# Patient Record
Sex: FEMALE | Race: WHITE | NOT HISPANIC OR LATINO | Employment: FULL TIME | URBAN - METROPOLITAN AREA
[De-identification: names, ages, dates, MRNs, and addresses within clinical notes are randomized per-mention and may not be internally consistent; named-entity substitution may affect disease eponyms.]

---

## 2019-10-14 ENCOUNTER — TRANSCRIBE ORDERS (OUTPATIENT)
Dept: PERINATAL CARE | Facility: CLINIC | Age: 29
End: 2019-10-14

## 2019-10-14 ENCOUNTER — OB ABSTRACT (OUTPATIENT)
Dept: PERINATAL CARE | Facility: CLINIC | Age: 29
End: 2019-10-14

## 2019-10-14 DIAGNOSIS — O99.810 ABNORMAL GLUCOSE COMPLICATING PREGNANCY: Primary | ICD-10-CM

## 2019-10-14 DIAGNOSIS — O09.93 HIGH-RISK PREGNANCY SUPERVISION, THIRD TRIMESTER: Primary | ICD-10-CM

## 2019-10-14 NOTE — PATIENT INSTRUCTIONS
Thank you for choosing [facility] for your  care today  If you have any questions about your ultrasound or care, please do not hesitate to contact us or your primary obstetrician  Please communicate your blood sugars at least weekly with the diabetic educators at the [address] [place] [address] Diabetes in Pregnancy program   The telephone number is [phone]  The e-mail address is [email or web address]  If you do not hear back from the program within 48 hours of sending your blood sugars, please call REESE Gee at [phone]  Thank you  Thank you so much for getting your influenza vaccine! Please aim for vaccination of your entire household and insist that anyone coming into contact with your baby be vaccinated    Tell your partner they need to be vaccinated as well to keep you safe (doctors orders:)

## 2019-10-16 ENCOUNTER — OFFICE VISIT (OUTPATIENT)
Dept: PERINATAL CARE | Facility: CLINIC | Age: 29
End: 2019-10-16
Payer: COMMERCIAL

## 2019-10-16 ENCOUNTER — ROUTINE PRENATAL (OUTPATIENT)
Dept: PERINATAL CARE | Facility: CLINIC | Age: 29
End: 2019-10-16
Payer: COMMERCIAL

## 2019-10-16 VITALS
BODY MASS INDEX: 29.7 KG/M2 | SYSTOLIC BLOOD PRESSURE: 128 MMHG | WEIGHT: 184.8 LBS | HEIGHT: 66 IN | DIASTOLIC BLOOD PRESSURE: 83 MMHG | HEART RATE: 98 BPM

## 2019-10-16 DIAGNOSIS — O99.810 ABNORMAL GLUCOSE COMPLICATING PREGNANCY: ICD-10-CM

## 2019-10-16 DIAGNOSIS — O24.410 DIET CONTROLLED GESTATIONAL DIABETES MELLITUS (GDM) IN THIRD TRIMESTER: Primary | ICD-10-CM

## 2019-10-16 DIAGNOSIS — O24.419 GESTATIONAL DIABETES MELLITUS (GDM) IN THIRD TRIMESTER, GESTATIONAL DIABETES METHOD OF CONTROL UNSPECIFIED: Primary | ICD-10-CM

## 2019-10-16 DIAGNOSIS — Z3A.30 30 WEEKS GESTATION OF PREGNANCY: ICD-10-CM

## 2019-10-16 DIAGNOSIS — E66.3 OVERWEIGHT WITH BODY MASS INDEX (BMI) 25.0-29.9: ICD-10-CM

## 2019-10-16 DIAGNOSIS — O09.93 HIGH-RISK PREGNANCY SUPERVISION, THIRD TRIMESTER: ICD-10-CM

## 2019-10-16 DIAGNOSIS — Z36.3 ENCOUNTER FOR ANTENATAL SCREENING FOR MALFORMATIONS: ICD-10-CM

## 2019-10-16 PROCEDURE — 76805 OB US >/= 14 WKS SNGL FETUS: CPT | Performed by: OBSTETRICS & GYNECOLOGY

## 2019-10-16 PROCEDURE — G0108 DIAB MANAGE TRN  PER INDIV: HCPCS | Performed by: DIETITIAN, REGISTERED

## 2019-10-16 PROCEDURE — 99241 PR OFFICE CONSULTATION NEW/ESTAB PATIENT 15 MIN: CPT | Performed by: OBSTETRICS & GYNECOLOGY

## 2019-10-16 RX ORDER — BLOOD SUGAR DIAGNOSTIC
STRIP MISCELLANEOUS
Qty: 100 EACH | Refills: 4 | Status: SHIPPED | OUTPATIENT
Start: 2019-10-16 | End: 2019-12-25

## 2019-10-16 RX ORDER — LANCETS 33 GAUGE
EACH MISCELLANEOUS
Qty: 100 EACH | Refills: 4 | Status: SHIPPED | OUTPATIENT
Start: 2019-10-16 | End: 2019-12-25

## 2019-10-16 NOTE — PROGRESS NOTES
10/16/19  Nava Davenport   1990  Estimated Date of Delivery: 19   EGA: 30w0d    Dear Dr Cristobal Khalil at [facility]:    Thank you for referring your patient to the Diabetes and Pregnancy Program at [address] [place] [address]  The patient attended class 1 and patient received the following education:     Pathophysiology of diabetes and pregnancy  This includes maternal-fetal complications such as fetal macrosomia,  hypoglycemia, polyhydramnios, increased incidence of  section, pre-term labor and in severe cases, fetal demise and stillbirth   Instruction on diet and glucometer use was provided  Self-monitoring of blood glucose levels: fasting (goal 60mg/dl to 90mg/dl) and two hours after the start of the meal less (goal less than 120mg/dl)  The patient was provided with a One-Touch Verio blood glucose meter and supplies  Blood glucose during demonstration was 87   Medical Nutrition Therapy for diabetes and pregnancy  The patient was provided with a 2000 calorie meal plan and the following was reviewed:     o Basic review of macronutrients   o Meal pattern should consist of three small meals and three snacks daily  o Carbohydrate gram amounts per meal   o Instructions on how to read a food label  o Appropriate serving sizes for carbohydrates and proteins  o Incorporating protein at each meal and snack  o Maintain a three day food diary and bring to class 2    [facility] blood glucose [facility] the [facility] weekly or as directed:  o Phone : [phone]  If no response in 24 hours, call [phone]   o Fax: [phone]  o Email: [email or web address]  The patient is scheduled to attend class 2 on Friday, 10/25/19  Additionally, fetal ultrasound evaluation by the Perinatologist has been scheduled to assure continuity of care  Please contact the Diabetes and Pregnancy Program at [phone] if you have any questions    Time spent with patient 9:35-10:35 AM; time spent face to face counseling greater than 50% of the appointment      Sincerely,   Berkley Gomez  Diabetes Educator   Diabetes and Pregnancy Program

## 2019-10-16 NOTE — PROGRESS NOTES
[address]: Ms Gordon Pritchard was seen today at 30w0d for anatomic survey ultrasound  See ultrasound report under "OB Procedures" tab  Please don't hesitate to contact our office with any concerns or questions    Sammie Colvin MD

## 2019-10-25 ENCOUNTER — DOCUMENTATION (OUTPATIENT)
Dept: PERINATAL CARE | Facility: CLINIC | Age: 29
End: 2019-10-25

## 2019-10-25 ENCOUNTER — OFFICE VISIT (OUTPATIENT)
Dept: PERINATAL CARE | Facility: CLINIC | Age: 29
End: 2019-10-25
Payer: COMMERCIAL

## 2019-10-25 VITALS
HEIGHT: 66 IN | DIASTOLIC BLOOD PRESSURE: 66 MMHG | BODY MASS INDEX: 29.77 KG/M2 | HEART RATE: 84 BPM | SYSTOLIC BLOOD PRESSURE: 104 MMHG | WEIGHT: 185.2 LBS

## 2019-10-25 DIAGNOSIS — E66.3 OVERWEIGHT WITH BODY MASS INDEX (BMI) 25.0-29.9: ICD-10-CM

## 2019-10-25 DIAGNOSIS — O24.410 DIET CONTROLLED GESTATIONAL DIABETES MELLITUS (GDM) IN THIRD TRIMESTER: Primary | ICD-10-CM

## 2019-10-25 DIAGNOSIS — Z3A.31 31 WEEKS GESTATION OF PREGNANCY: ICD-10-CM

## 2019-10-25 PROCEDURE — G0108 DIAB MANAGE TRN  PER INDIV: HCPCS | Performed by: DIETITIAN, REGISTERED

## 2019-10-25 NOTE — PROGRESS NOTES
Date:  10/25/19  RE: Rain Fam    : 1990  Estimated Date of Delivery: 19  EGA: 31w2d  OB/GYN: Janneth Turner at [facility]  Diet controlled gestational diabetes    Date Fasting Post-  breakfast Post-  lunch Post-  dinner Before bedtime Carbs Comments   10/16/19  87 94 92      10/17/19 87 94 101 90      10/18/19 85 84 110 91      10/19/19 81 87 97 91      10/20/19 87 91 83 99      10/21/19 85 101 106 93      10/22/19 83 119 110 99      10/23/19 80 89 90 112      10/24/19 85           Current regimen:  2200 calorie GDM diet with 3 meals & 3 snacks  From food diary, she has decreased to 3 CHO serving (45 gms) & 4 oz protein at both lunch & dinner  Eats a breakfast sandwich at breakfast   Self-Blood Glucose Monitoring 4 times daily with a One-Touch Verio glucose meter  As an active job at a day-care center; job is her exercise  Plan:  Continue current regimen  10/16 Ultrasound indicates normal growth & fluids      Date due to report next:  Thursday, 10/31/19    Melvin Avendano  Diabetes Educator   Diabetes and Pregnancy Program

## 2019-10-31 ENCOUNTER — DOCUMENTATION (OUTPATIENT)
Dept: PERINATAL CARE | Facility: CLINIC | Age: 29
End: 2019-10-31

## 2019-10-31 NOTE — PROGRESS NOTES
Date:  10/31/19  RE: Amos Media    : 1990  Estimated Date of Delivery: 19  EGA: 32w1d  OB/GYN: Mickey Mchugh at [facility]  Diet controlled gestational diabetes    Date Fasting Post-  breakfast Post-  lunch Post-  dinner Before bedtime Carbs Comments   10/24/19 85 104 109 110      10/25/19 83 99 110 99      10/26/19 82 92 99 96      10/27/19 78 79 97 103        Current regimen:  2200 calorie GDM diet with 3 meals & 3 snacks  From food diary, she has decreased to 3 CHO serving (45 gms) & 4 oz protein at both lunch & dinner  Eats a breakfast sandwich at breakfast   Self-Blood Glucose Monitoring 4 times daily with a One-Touch Verio glucose meter  Has an active job at a day-care center; job is her exercise  Plan:  Continue current regimen  10/16 Ultrasound indicates normal growth & fluids      Date due to report next:  Thursday, 19    Leonor Bernal  Diabetes Educator   Diabetes and Pregnancy Program

## 2019-11-07 ENCOUNTER — DOCUMENTATION (OUTPATIENT)
Dept: PERINATAL CARE | Facility: CLINIC | Age: 29
End: 2019-11-07

## 2019-11-07 NOTE — PROGRESS NOTES
Date:  19  RE: Mary Jo Verma    : 1990  Estimated Date of Delivery: 19  EGA: 33w1d  OB/GYN: Diego Duncan at [facility]  Diet controlled gestational diabetes            BG logs copied from patient's e-mail  Current regimen:  2200 calorie GDM diet with 3 meals & 3 snacks  From food diary, she has decreased to 3 CHO serving (45 gms) & 4 oz protein at both lunch & dinner  Eats a breakfast sandwich at breakfast   Self-Blood Glucose Monitoring 4 times daily with a One-Touch Verio glucose meter  Has an active job at a day-care center; job is her exercise  Plan:  Continue current regimen  10/16 Ultrasound indicates normal growth & fluids      Date due to report next:  Thursday, 19    Lacey Su  Diabetes Educator   Diabetes and Pregnancy Program

## 2019-11-15 ENCOUNTER — ULTRASOUND (OUTPATIENT)
Dept: PERINATAL CARE | Facility: CLINIC | Age: 29
End: 2019-11-15
Payer: COMMERCIAL

## 2019-11-15 ENCOUNTER — DOCUMENTATION (OUTPATIENT)
Dept: PERINATAL CARE | Facility: CLINIC | Age: 29
End: 2019-11-15

## 2019-11-15 VITALS
DIASTOLIC BLOOD PRESSURE: 66 MMHG | BODY MASS INDEX: 30.18 KG/M2 | SYSTOLIC BLOOD PRESSURE: 104 MMHG | HEART RATE: 88 BPM | HEIGHT: 66 IN | WEIGHT: 187.8 LBS

## 2019-11-15 DIAGNOSIS — Z3A.34 34 WEEKS GESTATION OF PREGNANCY: ICD-10-CM

## 2019-11-15 DIAGNOSIS — O24.410 DIET CONTROLLED GESTATIONAL DIABETES MELLITUS (GDM) IN THIRD TRIMESTER: Primary | ICD-10-CM

## 2019-11-15 PROCEDURE — 76816 OB US FOLLOW-UP PER FETUS: CPT | Performed by: OBSTETRICS & GYNECOLOGY

## 2019-11-15 PROCEDURE — 99212 OFFICE O/P EST SF 10 MIN: CPT | Performed by: OBSTETRICS & GYNECOLOGY

## 2019-11-15 NOTE — PROGRESS NOTES
Date:  11/15/19  RE: Sharda Person    : 1990  Estimated Date of Delivery: 19  EGA: 34w2d  OB/GYN: Gayathri Santos at [facility]  Diet controlled gestational diabetes              BG logs copied from patient's e-mail  Current regimen:  2200 calorie GDM diet with 3 meals & 3 snacks  From food diary, she has decreased to 3 CHO serving (45 gms) & 4 oz protein at both lunch & dinner  Eats a breakfast sandwich at breakfast   Self-Blood Glucose Monitoring 4 times daily with a One-Touch Verio glucose meter  Has an active job at a day-care center; job is her exercise  Plan:  Continue current regimen  11/15 Ultrasound indicates normal growth & fluids      Date due to report next:  Thursday, 19    Dalton Myers RD, LDN  Diabetes Educator   Diabetes and Pregnancy Program

## 2019-11-15 NOTE — PROGRESS NOTES
The patient was seen today for an ultrasound  Please see ultrasound report (located under Ob Procedures) for additional details  Thank you very much for allowing us to participate in the care of this very nice patient  Should you have any questions, please do not hesitate to contact me  MD Jhon Donohueucah  Attending Physician, [facility]

## 2019-11-15 NOTE — PATIENT INSTRUCTIONS
Please communicate your blood sugars at least weekly with the diabetic educators at the [address] [place] [address] Diabetes in Pregnancy program   The telephone number is [phone]  The e-mail address is [email or web address]  If you do not hear back from the program within 48 hours of sending your blood sugars, please call REESE Jenkins at [phone]  Thank you

## 2019-11-22 ENCOUNTER — DOCUMENTATION (OUTPATIENT)
Dept: PERINATAL CARE | Facility: CLINIC | Age: 29
End: 2019-11-22

## 2019-11-22 NOTE — PROGRESS NOTES
Date:  19  RE: Dav Lamar    : 1990  Estimated Date of Delivery: 19  EGA: 35w2d  OB/GYN: Deshawn Patton at [facility]  Diet controlled gestational diabetes              BG logs copied from patient's e-mail  Current regimen:  2200 calorie GDM diet with 3 meals & 3 snacks  From food diary, she has decreased to 3 CHO serving (45 gms) & 4 oz protein at both lunch & dinner  Eats a breakfast sandwich at breakfast   Self-Blood Glucose Monitoring 4 times daily with a One-Touch Verio glucose meter  Has an active job at a day-care center; job is her exercise  Plan:  Continue current regimen  11/15 Ultrasound indicates normal growth & fluids      Date due to report next:  Thursday, 19    Kendall Trotter MA  Diabetes Educator   Diabetes and Pregnancy Program

## 2019-11-22 NOTE — PROGRESS NOTES
Date:  19  RE: Tracie Hasmukh    : 1990  Estimated Date of Delivery: 19  EGA: 35w2d  OB/GYN: Sofia John at [facility]  Diet controlled gestational diabetes            BG logs copied from patient's e-mail  Current regimen:  2200 calorie GDM diet with 3 meals & 3 snacks  From food diary, she has decreased to 3 CHO serving (45 gms) & 4 oz protein at both lunch & dinner  Eats a breakfast sandwich at breakfast   Self-Blood Glucose Monitoring 4 times daily with a One-Touch Verio glucose meter  Has an active job at a day-care center; job is her exercise  Plan:  Continue current regimen  11/15 Ultrasound indicates normal growth & fluids      Date due to report next:  Wednesday,     Simone Mata RD, LDN  Diabetes Educator   Diabetes and Pregnancy Program

## 2019-11-27 ENCOUNTER — DOCUMENTATION (OUTPATIENT)
Dept: PERINATAL CARE | Facility: CLINIC | Age: 29
End: 2019-11-27

## 2019-11-27 NOTE — PROGRESS NOTES
Date:  19  RE: Sharda Person    : 1990  Estimated Date of Delivery: 19  EGA: 36w0d  OB/GYN: Gayathri Santos at [facility]  Diet controlled gestational diabetes            BG logs copied from patient's e-mail  Current regimen:  2200 calorie GDM diet with 3 meals & 3 snacks  From food diary, she has decreased to 3 CHO serving (45 gms) & 4 oz protein at both lunch & dinner  Eats a breakfast sandwich at breakfast   Self-Blood Glucose Monitoring 4 times daily with a One-Touch Verio glucose meter  Has an active job at a day-care center; job is her exercise  Plan:  Continue current regimen  11/15 Ultrasound indicates normal growth & fluids      Date due to report next:  Wednesday, 12/3/19    Hiren De Jesus RD, LDN  Diabetes Educator   Diabetes and Pregnancy Program

## 2019-12-04 ENCOUNTER — DOCUMENTATION (OUTPATIENT)
Dept: PERINATAL CARE | Facility: CLINIC | Age: 29
End: 2019-12-04

## 2019-12-04 NOTE — PROGRESS NOTES
Date:  19  RE: Mary Jo Verma    : 1990  Estimated Date of Delivery: 19  EGA: 37w0d  OB/GYN: Diego Duncan at [facility]  Diet controlled gestational diabetes            BG logs copied from patient's e-mail  Current regimen:  2200 calorie GDM diet with 3 meals & 3 snacks  From food diary, she has decreased to 3 CHO serving (45 gms) & 4 oz protein at both lunch & dinner  Eats a breakfast sandwich at breakfast   Self-Blood Glucose Monitoring 4 times daily with a One-Touch Verio glucose meter  Has an active job at a day-care center; job is her exercise  Plan:  Continue current regimen  11/15 Ultrasound indicates normal growth & fluids      Date due to report next:  Wednesday, 12/3/19    Hellen Saunders MA   Diabetes Educator   Diabetes and Pregnancy Program
37.2

## 2019-12-04 NOTE — PROGRESS NOTES
Date:  19  RE: Lisa Willingham    : 1990  Estimated Date of Delivery: 19  EGA: 37w0d  OB/GYN: Duane Faye at [facility]  Diet controlled gestational diabetes            BG logs copied from patient's e-mail  Current regimen:  2200 calorie GDM diet with 3 meals & 3 snacks  From food diary, she has decreased to 3 CHO serving (45 gms) & 4 oz protein at both lunch & dinner  Eats a breakfast sandwich at breakfast   Self-Blood Glucose Monitoring 4 times daily with a One-Touch Verio glucose meter  Has an active job at a day-care center; job is her exercise  Plan:  Continue current regimen  11/15 Ultrasound indicates normal growth & fluids      Date due to report next:  Wednesday, 19    Savannah Braden   Diabetes Educator   Diabetes and Pregnancy Program

## 2019-12-12 ENCOUNTER — DOCUMENTATION (OUTPATIENT)
Dept: PERINATAL CARE | Facility: CLINIC | Age: 29
End: 2019-12-12

## 2019-12-12 NOTE — PROGRESS NOTES
Date:  19  RE: Kam Fernandez    : 1990  BRITNEY: 19  EGA: 38w1d  OB/GYN: Shubham Dalal at [facility]  Diet controlled gestational diabetes          BG logs copied from patient's e-mail dates -19  Current regimen:  2200 calorie GDM diet with 3 meals/snacks  Previously from food diary decreased diet to 3 carbohydrate and 4 protein servings at both lunch & dinner  Eats a breakfast sandwich at breakfast   Self monitoring blood glucose  Fasting and 2 hours after start of meals with a One-Touch Verio glucose meter  Has an active job at a day-care center; job is her exercise  Plan:  Continue current regimen  11/15 Ultrasound indicates normal growth & fluids      Date due to report next:  Wednesday, 19    Tiana Kee, RN  BS CDE  Diabetes Educator   Diabetes and Pregnancy Program

## 2024-07-15 ENCOUNTER — OFFICE VISIT (OUTPATIENT)
Dept: OBGYN CLINIC | Facility: CLINIC | Age: 34
End: 2024-07-15
Payer: COMMERCIAL

## 2024-07-15 VITALS
HEIGHT: 66 IN | SYSTOLIC BLOOD PRESSURE: 106 MMHG | WEIGHT: 178.2 LBS | BODY MASS INDEX: 28.64 KG/M2 | DIASTOLIC BLOOD PRESSURE: 64 MMHG

## 2024-07-15 DIAGNOSIS — N39.0 RECURRENT POSTCOITAL URINARY TRACT INFECTION: ICD-10-CM

## 2024-07-15 DIAGNOSIS — Z01.419 ROUTINE GYNECOLOGICAL EXAMINATION: Primary | ICD-10-CM

## 2024-07-15 PROCEDURE — 99385 PREV VISIT NEW AGE 18-39: CPT | Performed by: NURSE PRACTITIONER

## 2024-07-15 RX ORDER — NITROFURANTOIN 25; 75 MG/1; MG/1
100 CAPSULE ORAL ONCE
Qty: 30 CAPSULE | Refills: 1 | Status: SHIPPED | OUTPATIENT
Start: 2024-07-15 | End: 2024-07-15

## 2024-07-15 RX ORDER — LEVONORGESTREL 52 MG/1
1 INTRAUTERINE DEVICE INTRAUTERINE
COMMUNITY
Start: 2020-02-26

## 2024-07-15 NOTE — PROGRESS NOTES
[facility] - [address], [address], [place] [address]    ASSESSMENT/PLAN: Tasha Granados is a 34 y.o.  who presents for annual gynecologic exam.    Encounter for routine gynecologic examination  - Routine well woman exam completed today.  - Cervical Cancer Screening: Current ASCCP Guidelines reviewed. Last Pap: Not on file . History of abnormal: Yes, colposcopy, no treatment  - HPV Vaccination status: Not immunized  - STI screening offered including HIV testing: offered, pt declined  - Contraceptive counseling discussed.  Current contraception: IUD:   - The following were reviewed in today's visit: breast self exam, mammography screening ordered, adequate intake of calcium and vitamin D, exercise, and healthy diet    Additional problems addressed during this visit:  1. Routine gynecological examination  -     Thinprep Tis Pap and HPV mRNA E6/E7 Reflex HPV 16,18/45  2. Recurrent postcoital urinary tract infection  -     nitrofurantoin (MACROBID) 100 mg capsule; Take 1 capsule (100 mg total) by mouth 1 (one) time for 1 dose PRN postcoital  Interested in postcoital antibiotic prophylaxis, rx to pharmacy  Discussed risks of AMA pregnancy briefly, pt considering  Call with questions prn  Return visit 1 year/prn    CC:  Annual Gynecologic Examination    HPI: Tasha Granados is a 34 y.o.  who presents for annual gynecologic examination. She is doing well and has no complaints today. No exam since last child, age 3. Has Liletta in place since , no concerns. Has monthly spotting, when menses is due. Considering trying to conceive. History of frequent postcoital UTI, most recent was April. Pt feels that this constance her from having sex and decreases her libido. She is interested in treatment. She sees her PCP for routine care. She feels safe.     ROS: Negative except as noted in HPI    Patient's last menstrual period was 2024 (approximate).       She  reports being  sexually active and has had partner(s) who are male. She reports using the following method of birth control/protection: I.U.D..       The following portions of the patient's history were reviewed and updated as appropriate:   Past Medical History:   Diagnosis Date    ASCUS of cervix with negative high risk HPV     Gestational diabetes     diet controlled with current pregnancy    History of abnormal cervical Pap smear 11/2016    History of chlamydia 2010    LGSIL on Pap smear of cervix 2017     Past Surgical History:   Procedure Laterality Date    CHOLECYSTECTOMY      COLPOSCOPY      WISDOM TOOTH EXTRACTION       Family History   Problem Relation Age of Onset    Heart disease Mother     Hypertension Father     No Known Problems Brother     No Known Problems Brother     No Known Problems Daughter     No Known Problems Son     Heart disease Maternal Grandmother     No Known Problems Maternal Grandfather     No Known Problems Paternal Grandmother     No Known Problems Paternal Grandfather      Social History     Socioeconomic History    Marital status: /Civil Union     Spouse name: None    Number of children: None    Years of education: None    Highest education level: None   Occupational History    None   Tobacco Use    Smoking status: Never    Smokeless tobacco: Never   Vaping Use    Vaping status: Never Used   Substance and Sexual Activity    Alcohol use: Yes     Comment: rare    Drug use: Never    Sexual activity: Yes     Partners: Male     Birth control/protection: I.U.D.     Comment: no new partner in past year   Other Topics Concern    None   Social History Narrative    None     Social Determinants of Health     Financial Resource Strain: Not on file   Food Insecurity: Not on file   Transportation Needs: Not on file   Physical Activity: Not on file   Stress: Not on file   Social Connections: Not on file   Intimate Partner Violence: Not on file   Housing Stability: Not on file     Outpatient Medications  "Marked as Taking for the 7/15/24 encounter (Office Visit) with REESE Culver   Medication    levonorgestrel (Liletta, 52 MG,) 20.1 mcg/day IUD    nitrofurantoin (MACROBID) 100 mg capsule     No Known Allergies        Objective:  /64 (BP Location: Left arm, Patient Position: Sitting, Cuff Size: Large)   Ht 5' 6\" (1.676 m)   Wt 80.8 kg (178 lb 3.2 oz)   LMP 06/30/2024 (Approximate)   BMI 28.76 kg/m²        Chaperone present? Declines.    General Appearance: alert and oriented, in no acute distress.   Neck/Thyroid: No thyromegaly, no thyroid nodules.  Respiratory: Unlabored breathing.  Cardiovascular: No peripheral edema.  Abdomen: Soft, non-tender, non-distended, no masses, no rebound or guarding.  Breast Exam: No dimpling, nipple retraction or discharge. No lumps or masses. No axillary or supraclavicular nodes.   Pelvic:       External genitalia: Normal appearance, no abnormal pigmentation, no lesions or masses. Normal Bartholin's and Rector's.      Urinary system: Urethral meatus normal, bladder non-tender.      Vaginal: normal mucosa without prolapse or lesions. Normal-appearing physiologic discharge.      Cervix: Normal-appearing, well-epithelialized, no gross lesions or masses. No cervical motion tenderness.      Adnexa: No adnexal masses or tenderness noted.      Uterus: Normal-sized, regular contour, midline, mobile, no uterine tenderness.  Extremities: Normal range of motion. Warm, well-perfused, non-tender.   Skin: normal, no rash or abnormalities  Neurologic: alert, oriented x3  Psychiatric: Appropriate affect, mood stable, cooperative with exam.        REESE Culver  7/15/2024 3:11 PM  "

## 2024-07-17 LAB
CLINICAL INFO: NORMAL
CYTO CVX: NORMAL
CYTOLOGY CMNT CVX/VAG CYTO-IMP: NORMAL
DATE PREVIOUS BX: NORMAL
HPV E6+E7 MRNA CVX QL NAA+PROBE: NOT DETECTED
LMP START DATE: NORMAL
SL AMB PREV. PAP:: NORMAL
SPECIMEN SOURCE CVX/VAG CYTO: NORMAL

## 2025-06-03 ENCOUNTER — NURSE TRIAGE (OUTPATIENT)
Age: 35
End: 2025-06-03

## 2025-06-03 NOTE — TELEPHONE ENCOUNTER
"REASON FOR CONVERSATION: Abdominal Pain    SYMPTOMS: Intermittent RLQ abdominal pain, dull ache for 1.5-2 weeks.  Has IUD so does not get a menses.  Has not checked for strings, but did have -HPT.  Intermittent lower back pain as well.  Denies any other symptoms.      OTHER HEALTH INFORMATION: None    PROTOCOL DISPOSITION: See Within 3 Days in Office (overriding See Today or Tomorrow in Office)    CARE ADVICE PROVIDED: Can take ibuprofen for pain.  Call back if pain becomes severe or if you have any heavy bleeding.     PRACTICE FOLLOW-UP: None          Reason for Disposition   MILD pain (e.g., does not interfere with normal activities) and pain comes and goes (cramps) lasts > 48 hours  (Exception: This same abdominal pain is a chronic symptom recurrent or ongoing AND present > 4 weeks.)    Answer Assessment - Initial Assessment Questions  1. LOCATION: \"Where does it hurt?\"       RLQ  2. RADIATION: \"Does the pain shoot anywhere else?\" (e.g., chest, back)      Sometimes into back.  3. ONSET: \"When did the pain begin?\" (e.g., minutes, hours or days ago)       1.5-2 weeks   4. SUDDEN: \"Gradual or sudden onset?\"      Gradual  5. PATTERN \"Does the pain come and go, or is it constant?\"      Comes and goes  6. SEVERITY: \"How bad is the pain?\"  (e.g., Scale 1-10; mild, moderate, or severe)      Dull ache  7. RECURRENT SYMPTOM: \"Have you ever had this type of stomach pain before?\" If Yes, ask: \"When was the last time?\" and \"What happened that time?\"       yes  8. CAUSE: \"What do you think is causing the stomach pain?\"      unsure  9. RELIEVING/AGGRAVATING FACTORS: \"What makes it better or worse?\" (e.g., antacids, bending or twisting motion, bowel movement)      denies  10. OTHER SYMPTOMS: \"Do you have any other symptoms?\" (e.g., back pain, diarrhea, fever, urination pain, vomiting)        Back pain  11. PREGNANCY: \"Is there any chance you are pregnant?\" \"When was your last menstrual period?\"        IUD    Protocols used: " Abdominal Pain - Female-Adult-[place]

## 2025-06-05 ENCOUNTER — OFFICE VISIT (OUTPATIENT)
Dept: OBGYN CLINIC | Facility: CLINIC | Age: 35
End: 2025-06-05
Payer: COMMERCIAL

## 2025-06-05 VITALS
WEIGHT: 173 LBS | HEIGHT: 66 IN | DIASTOLIC BLOOD PRESSURE: 76 MMHG | SYSTOLIC BLOOD PRESSURE: 110 MMHG | BODY MASS INDEX: 27.8 KG/M2

## 2025-06-05 DIAGNOSIS — Z97.5 PRESENCE OF IUD: ICD-10-CM

## 2025-06-05 DIAGNOSIS — R10.2 PELVIC PAIN IN FEMALE: Primary | ICD-10-CM

## 2025-06-05 LAB
SL AMB  POCT GLUCOSE, UA: ABNORMAL
SL AMB LEUKOCYTE ESTERASE,UA: ABNORMAL
SL AMB POCT BILIRUBIN,UA: ABNORMAL
SL AMB POCT BLOOD,UA: ABNORMAL
SL AMB POCT COLOR,UA: YELLOW
SL AMB POCT KETONES,UA: ABNORMAL
SL AMB POCT NITRITE,UA: ABNORMAL
SL AMB POCT PH,UA: 5
SL AMB POCT SPECIFIC GRAVITY,UA: 1.03
SL AMB POCT URINE PROTEIN: ABNORMAL
SL AMB POCT UROBILINOGEN: ABNORMAL

## 2025-06-05 PROCEDURE — 99213 OFFICE O/P EST LOW 20 MIN: CPT | Performed by: OBSTETRICS & GYNECOLOGY

## 2025-06-05 PROCEDURE — 81002 URINALYSIS NONAUTO W/O SCOPE: CPT | Performed by: OBSTETRICS & GYNECOLOGY

## 2025-06-05 NOTE — PROGRESS NOTES
"PROBLEM GYNECOLOGICAL VISIT    Tasha Granados is a 35 y.o. female who presents today with complaint of RLQ  pain x 2 weeks    Her general medical history has been reviewed and she reports it as follows:    Past Medical History[1]  Past Surgical History[2]  OB History        2    Para   2    Term   2       0    AB   0    Living   2       SAB        IAB        Ectopic        Multiple        Live Births   2               Social History[3]    Current Outpatient Medications   Medication Instructions   • levonorgestrel (Liletta, 52 MG,) 20.1 mcg/day IUD 1 each, Once every 8 years - IUD   • Prenatal Vit w/Ch-Wmbclqrsi-ZM (PNV PO) 1 tablet, Daily       History of Present Illness:    Hx of Liletta iud since  . Light  menses .  + hx of  post coital  UTI/dysuria  uses macrobid.   No fevers or chills, no dysuria  pain has been for 2 weeks.   No chag   in  dc  same partner .   Pin scale 1-4  is a 2     Review of Systems:  Review of Systems   Constitutional: Negative.    Gastrointestinal: Negative.    Genitourinary:  Positive for pelvic pain. Negative for decreased urine volume, difficulty urinating, dyspareunia, dysuria, flank pain, frequency, menstrual problem, urgency, vaginal bleeding, vaginal discharge and vaginal pain.   Skin: Negative.    Allergic/Immunologic: Negative.    Neurological: Negative.    Hematological: Negative.    Psychiatric/Behavioral: Negative.         Physical Exam:  /76 (BP Location: Right arm, Patient Position: Sitting, Cuff Size: Standard)   Ht 5' 6\" (1.676 m)   Wt 78.5 kg (173 lb)   Breastfeeding No   BMI 27.92 kg/m²   Physical Exam  Constitutional:       Appearance: Normal appearance. She is normal weight.   Genitourinary:      Vulva, bladder and rectum normal.      No lesions in the vagina.      Right Labia: No rash, tenderness, lesions or skin changes.     Left Labia: No tenderness, lesions, skin changes or rash.     No labial fusion noted.      No inguinal adenopathy " present in the right or left side.     No vaginal discharge, erythema, tenderness or bleeding.      No vaginal prolapse present.       Right Adnexa: not tender, not full, not palpable and no mass present.     Left Adnexa: not tender, not full, not palpable and no mass present.     Cervix is not nulliparous or parous.      No cervical motion tenderness, discharge, friability, lesion, polyp, nabothian cyst, eversion or elongation.      No parametrium thickening present.     Uterus is not enlarged, fixed, tender or prolapsed.      No uterine mass detected.     No urethral prolapse, tenderness, mass, hypermobility or discharge present.      Pelvic floor neuro is intact.     Pelvic exam was performed with patient in the lithotomy position and normal support.   Abdominal:      General: Abdomen is flat. Bowel sounds are normal. There is no distension.      Palpations: Abdomen is soft. There is no splenomegaly or mass.      Tenderness: There is no abdominal tenderness. There is no right CVA tenderness, guarding or rebound.      Hernia: There is no hernia in the left inguinal area or right inguinal area.   Lymphadenopathy:      Lower Body: No right inguinal adenopathy. No left inguinal adenopathy.     Neurological:      Mental Status: She is alert and oriented to person, place, and time.     Skin:     General: Skin is warm and dry.     Psychiatric:         Mood and Affect: Mood normal.         Thought Content: Thought content normal.         Judgment: Judgment normal.   Vitals and nursing note reviewed.           Assessment:   1. Pelvic  pain  ,  iud surv      Plan UA and culture sent ,   tv us  to assess ovaries , lining and   uterus. Will check for  IUD   Iud string in place .   Reviewed S+S of torsion  fu after.      Reviewed with patient that test results are available in [facility] immediately, but that they will not necessarily be reviewed by me immediately.  Explained that I will review results at my earliest opportunity  and contact patient appropriately.         [1]  Past Medical History:  Diagnosis Date   • ASCUS of cervix with negative high risk HPV    • Gestational diabetes     diet controlled with current pregnancy   • History of abnormal cervical Pap smear 11/2016   • History of chlamydia 2010   • LGSIL on Pap smear of cervix 2017   [2]  Past Surgical History:  Procedure Laterality Date   • CHOLECYSTECTOMY     • COLPOSCOPY     • WISDOM TOOTH EXTRACTION     [3]  Social History  Tobacco Use   • Smoking status: Never   • Smokeless tobacco: Never   Vaping Use   • Vaping status: Never Used   Substance Use Topics   • Alcohol use: Yes     Comment: rare   • Drug use: Never

## 2025-06-07 LAB
APPEARANCE UR: CLEAR
BACTERIA UR QL AUTO: ABNORMAL /HPF
BILIRUB UR QL STRIP: NEGATIVE
COLOR UR: ABNORMAL
GLUCOSE UR QL STRIP: NEGATIVE
HGB UR QL STRIP: NEGATIVE
HYALINE CASTS #/AREA URNS LPF: ABNORMAL /LPF
KETONES UR QL STRIP: ABNORMAL
LEUKOCYTE ESTERASE UR QL STRIP: ABNORMAL
NITRITE UR QL STRIP: NEGATIVE
PH UR STRIP: ABNORMAL [PH] (ref 5–8)
PROT UR QL STRIP: NEGATIVE
RBC #/AREA URNS HPF: ABNORMAL /HPF
SP GR UR STRIP: 1.03 (ref 1–1.03)
SQUAMOUS #/AREA URNS HPF: ABNORMAL /HPF
WBC #/AREA URNS HPF: ABNORMAL /HPF

## 2025-06-08 ENCOUNTER — RESULTS FOLLOW-UP (OUTPATIENT)
Dept: OBGYN CLINIC | Facility: CLINIC | Age: 35
End: 2025-06-08

## 2025-06-17 ENCOUNTER — TELEPHONE (OUTPATIENT)
Dept: OBGYN CLINIC | Facility: CLINIC | Age: 35
End: 2025-06-17

## 2025-06-17 NOTE — TELEPHONE ENCOUNTER
6/17/25 LMOM to call office and r/s appt to another provider due to Leonora helping [facility]

## 2025-07-07 PROBLEM — Z92.89 HISTORY OF PELVIC ULTRASOUND: Status: ACTIVE | Noted: 2025-07-07

## 2025-07-07 PROBLEM — N83.11 CORPUS LUTEUM CYST OF RIGHT OVARY: Status: ACTIVE | Noted: 2025-07-07

## 2025-08-06 PROBLEM — D64.9 ANEMIA, UNSPECIFIED: Status: ACTIVE | Noted: 2025-08-06

## 2025-08-21 ENCOUNTER — OFFICE VISIT (OUTPATIENT)
Age: 35
End: 2025-08-21
Payer: COMMERCIAL

## 2025-08-21 VITALS
TEMPERATURE: 98.9 F | HEIGHT: 67 IN | HEART RATE: 84 BPM | SYSTOLIC BLOOD PRESSURE: 130 MMHG | WEIGHT: 177 LBS | DIASTOLIC BLOOD PRESSURE: 83 MMHG | OXYGEN SATURATION: 98 % | BODY MASS INDEX: 27.78 KG/M2 | RESPIRATION RATE: 16 BRPM

## 2025-08-21 DIAGNOSIS — D69.6 THROMBOCYTOPENIA (HCC): ICD-10-CM

## 2025-08-21 DIAGNOSIS — E66.3 OVERWEIGHT (BMI 25.0-29.9): ICD-10-CM

## 2025-08-21 DIAGNOSIS — Z86.32 HISTORY OF GESTATIONAL DIABETES: ICD-10-CM

## 2025-08-21 DIAGNOSIS — Z11.4 SCREENING FOR HIV (HUMAN IMMUNODEFICIENCY VIRUS): ICD-10-CM

## 2025-08-21 DIAGNOSIS — Z00.00 ANNUAL PHYSICAL EXAM: Primary | ICD-10-CM

## 2025-08-21 DIAGNOSIS — Z11.59 NEED FOR HEPATITIS C SCREENING TEST: ICD-10-CM

## 2025-08-21 PROCEDURE — 99385 PREV VISIT NEW AGE 18-39: CPT | Performed by: FAMILY MEDICINE

## 2025-08-21 PROCEDURE — 99214 OFFICE O/P EST MOD 30 MIN: CPT | Performed by: FAMILY MEDICINE
